# Patient Record
Sex: MALE | Race: WHITE | NOT HISPANIC OR LATINO | Employment: FULL TIME | URBAN - METROPOLITAN AREA
[De-identification: names, ages, dates, MRNs, and addresses within clinical notes are randomized per-mention and may not be internally consistent; named-entity substitution may affect disease eponyms.]

---

## 2017-12-21 ENCOUNTER — OFFICE VISIT (OUTPATIENT)
Dept: MEDICAL GROUP | Facility: PHYSICIAN GROUP | Age: 22
End: 2017-12-21
Payer: COMMERCIAL

## 2017-12-21 ENCOUNTER — HOSPITAL ENCOUNTER (OUTPATIENT)
Facility: MEDICAL CENTER | Age: 22
End: 2017-12-21
Attending: NURSE PRACTITIONER
Payer: COMMERCIAL

## 2017-12-21 VITALS
HEART RATE: 95 BPM | TEMPERATURE: 99.5 F | HEIGHT: 77 IN | WEIGHT: 205.7 LBS | DIASTOLIC BLOOD PRESSURE: 78 MMHG | OXYGEN SATURATION: 96 % | SYSTOLIC BLOOD PRESSURE: 122 MMHG | BODY MASS INDEX: 24.29 KG/M2

## 2017-12-21 DIAGNOSIS — Z11.3 SCREEN FOR SEXUALLY TRANSMITTED DISEASES: ICD-10-CM

## 2017-12-21 DIAGNOSIS — Z76.89 ENCOUNTER TO ESTABLISH CARE: ICD-10-CM

## 2017-12-21 DIAGNOSIS — Z00.00 ENCOUNTER FOR PREVENTIVE HEALTH EXAMINATION: ICD-10-CM

## 2017-12-21 LAB
APPEARANCE UR: CLEAR
BILIRUB UR STRIP-MCNC: NORMAL MG/DL
COLOR UR AUTO: NORMAL
GLUCOSE UR STRIP.AUTO-MCNC: NORMAL MG/DL
KETONES UR STRIP.AUTO-MCNC: NORMAL MG/DL
LEUKOCYTE ESTERASE UR QL STRIP.AUTO: NORMAL
NITRITE UR QL STRIP.AUTO: NORMAL
PH UR STRIP.AUTO: 7 [PH] (ref 5–8)
PROT UR QL STRIP: NORMAL MG/DL
RBC UR QL AUTO: NORMAL
SP GR UR STRIP.AUTO: 1.01
UROBILINOGEN UR STRIP-MCNC: NORMAL MG/DL

## 2017-12-21 PROCEDURE — 87591 N.GONORRHOEAE DNA AMP PROB: CPT

## 2017-12-21 PROCEDURE — 99385 PREV VISIT NEW AGE 18-39: CPT | Performed by: NURSE PRACTITIONER

## 2017-12-21 PROCEDURE — 87491 CHLMYD TRACH DNA AMP PROBE: CPT

## 2017-12-21 PROCEDURE — 81002 URINALYSIS NONAUTO W/O SCOPE: CPT | Performed by: NURSE PRACTITIONER

## 2017-12-21 ASSESSMENT — PATIENT HEALTH QUESTIONNAIRE - PHQ9: CLINICAL INTERPRETATION OF PHQ2 SCORE: 0

## 2017-12-21 NOTE — PROGRESS NOTES
Chief Complaint   Patient presents with   • New Patient     STD check      See Sylvia Osborne is a 22 y.o. male here to establish care and for evaluation and management of the following:  HPI:    Most medical care provided by the team doctor. 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  Recently discovered girlfriend was not monogamous -  Recently tested positive for chlamydia after her other partner disclosed his infection. She informed him yesterday. He was initially using condoms but not recently. Last sexual activity 2.5 weeks ago. Denies any symptoms at this time - no dysuria, burning, pain, rash. Denies other risk factors.       Current medicines (including changes today)  No current outpatient prescriptions on file.     No current facility-administered medications for this visit.      He  has no chronic medical problems in past medical history.  He  has no past surgery.  Social History     Social History   • Marital status: Single     Spouse name: N/A   • Number of children: 0   • Years of education: N/A     Occupational History   •       Social History Main Topics   • Smoking status: Never Smoker   • Smokeless tobacco: Never Used   • Alcohol use Yes      Comment: Occassianally    • Drug use: No   • Sexual activity: Yes     Partners: Female     Other Topics Concern   • Not on file     Social History Narrative   • No narrative on file     History reviewed. No pertinent family history.  Family Status   Relation Status   • Mother Alive   • Father Alive   • Sister Alive       ROS  Constitutional: Negative for fever, chills, and unexplained weight loss.   HENT: Negative for ear pain, nosebleeds, and sore throat.  Eyes: Negative for changes in vision.   Respiratory: Negative for increased cough, sputum production, or wheezing.   Cardiovascular: Negative for chest pain, palpitations.   Gastrointestinal: Negative for constipation, diarrhea.   Genitourinary: Negative for dysuria.  "  Musculoskeletal: Negative for unusual joint pain or myalgias.   Skin: Negative for rash and itching.   Neurological: Negative for dizziness, tremors, focal weakness.   All other systems reviewed and are negative except as in HPI.         Objective:   Blood pressure 122/78, pulse 95, temperature 37.5 °C (99.5 °F), height 1.956 m (6' 5\"), weight 93.3 kg (205 lb 11.2 oz), SpO2 96 %. Body mass index is 24.39 kg/m².  Physical Exam:  Constitutional: Alert, oriented, in no acute distress.  Pleasant and cooperative with the examination.  Psych: Eye contact is good, speech goal directed, affect calm, normal judgement and insight.  Skin: Warm, dry, no rashes in visible areas.  HEENT: PER, conjunctiva and sclera clear.  Nares patent with no significant congestion or drainage.  Normal pinnae Neck: Supple, trachea midline.   Lungs: Normal effort and respirations. Clear to auscultation bilaterally.  CV: Regular rate and rhythm.  Lower extremities: no edema, pulses intact.  Neurological:  Grossly intact.        Assessment and Plan:   The following treatment plan was discussed    1. Encounter for preventive health examination  Well male     2. Screen for sexually transmitted diseases  Differential diagnosis, natural history, treatment options, supportive care, and indications for immediate follow-up discussed at length. Counseled regarding lifestyle modifications. He'll be notified of results and recommended treatment as indicated.  GC/CHLAMYDIA - specimen collected and sent to lab    3. Encounter to establish care      Followup: Return in about 1 year (around 12/21/2018) for preventive care visit.  Sooner if needed or with any new problems.         Take all medications as directed.  Report any side effects of medications.   Report any new symptoms.  Follow up as advised.  Have labs or other studies as ordered done as directed prior to follow up.  Please keep all appointments for any referrals given.    Please note this dictation " was created using voice recognition software. Every reasonable attempt has been made to correct obvious errors, however there may be errors of grammar and possibly content that were not discovered before finalizing the note.

## 2017-12-22 LAB
C TRACH DNA SPEC QL NAA+PROBE: NEGATIVE
N GONORRHOEA DNA SPEC QL NAA+PROBE: NEGATIVE
SPECIMEN SOURCE: NORMAL

## 2019-04-25 ENCOUNTER — HOSPITAL ENCOUNTER (OUTPATIENT)
Facility: MEDICAL CENTER | Age: 24
End: 2019-04-25
Attending: PHYSICIAN ASSISTANT
Payer: COMMERCIAL

## 2019-04-25 ENCOUNTER — OFFICE VISIT (OUTPATIENT)
Dept: URGENT CARE | Facility: PHYSICIAN GROUP | Age: 24
End: 2019-04-25
Payer: COMMERCIAL

## 2019-04-25 VITALS
TEMPERATURE: 99.3 F | HEIGHT: 77 IN | HEART RATE: 71 BPM | OXYGEN SATURATION: 98 % | SYSTOLIC BLOOD PRESSURE: 122 MMHG | WEIGHT: 206 LBS | BODY MASS INDEX: 24.32 KG/M2 | DIASTOLIC BLOOD PRESSURE: 68 MMHG

## 2019-04-25 DIAGNOSIS — R30.0 DYSURIA: ICD-10-CM

## 2019-04-25 PROCEDURE — 87086 URINE CULTURE/COLONY COUNT: CPT

## 2019-04-25 PROCEDURE — 99213 OFFICE O/P EST LOW 20 MIN: CPT | Performed by: PHYSICIAN ASSISTANT

## 2019-04-25 ASSESSMENT — ENCOUNTER SYMPTOMS
ABDOMINAL PAIN: 1
FLANK PAIN: 0

## 2019-04-25 ASSESSMENT — PAIN SCALES - GENERAL: PAINLEVEL: 5=MODERATE PAIN

## 2019-04-25 NOTE — PROGRESS NOTES
"  Subjective:   See Osborne is a 23 y.o. male who presents today with   Chief Complaint   Patient presents with   • Pelvic Pain     Lower abd pressure, dysuria, Was tested to STI and UTI and clear       Pelvic Pain   This is a new problem. The current episode started 1 to 4 weeks ago. The problem occurs constantly. The problem has been unchanged. Associated symptoms include abdominal pain (pressure) and urinary symptoms (dysuria).     Patient states that he did an online STD test which he took and request labs and everything came back negative he also did a urine test for UTI which he states came back negative.  He has been having lower abdominal pressure intermittently and has some irritation with every urination.  He denies any other associated symptoms.  PMH:  has no past medical history on file.  MEDS: No current outpatient prescriptions on file.  ALLERGIES: No Known Allergies  SURGHX: No past surgical history on file.  SOCHX:  reports that he has never smoked. He has never used smokeless tobacco. He reports that he drinks alcohol. He reports that he does not use drugs.  FH: Reviewed with patient, not pertinent to this visit.       Review of Systems   Gastrointestinal: Positive for abdominal pain (pressure).   Genitourinary: Positive for dysuria and pelvic pain. Negative for flank pain, frequency, hematuria and urgency.        Objective:   /68   Pulse 71   Temp 37.4 °C (99.3 °F)   Ht 1.956 m (6' 5\")   Wt 93.4 kg (206 lb)   SpO2 98%   BMI 24.43 kg/m²   Physical Exam   Constitutional: Vital signs are normal. He appears well-developed and well-nourished. No distress.   HENT:   Head: Normocephalic and atraumatic.   Right Ear: Hearing normal.   Left Ear: Hearing normal.   Eyes: Pupils are equal, round, and reactive to light.   Cardiovascular: Normal rate, regular rhythm and normal heart sounds.    Pulmonary/Chest: Effort normal.   Abdominal: There is tenderness in the suprapubic area. "   Genitourinary: Testes normal and penis normal.   Genitourinary Comments: Patient deferred  exam   Musculoskeletal:   Normal movement in all 4 extremities   Neurological: He is alert. Coordination normal.   Skin: Skin is warm and dry.   Psychiatric: He has a normal mood and affect.   Nursing note and vitals reviewed.    UA negative    Assessment/Plan:   Assessment    1. Dysuria  - POCT Urinalysis  - REFERRAL TO UROLOGY  - Urine Culture; Future  Given patient's unimpressive exam and negative urine today patient will follow-up with urology unless symptoms worsen he will be seen sooner.  Differential diagnosis, natural history, supportive care, and indications for immediate follow-up discussed.   Patient given instructions and understanding of medications and treatment.    If not improving in 3-5 days, F/U with PCP or return to UC if symptoms worsen.    Patient agreeable to plan.      John Harp PA-C

## 2019-04-26 DIAGNOSIS — R30.0 DYSURIA: ICD-10-CM

## 2019-04-28 LAB
BACTERIA UR CULT: NORMAL
SIGNIFICANT IND 70042: NORMAL
SITE SITE: NORMAL
SOURCE SOURCE: NORMAL

## 2023-10-26 ENCOUNTER — OFFICE VISIT (OUTPATIENT)
Dept: URGENT CARE | Facility: PHYSICIAN GROUP | Age: 28
End: 2023-10-26
Payer: COMMERCIAL

## 2023-10-26 VITALS
WEIGHT: 216 LBS | SYSTOLIC BLOOD PRESSURE: 122 MMHG | RESPIRATION RATE: 16 BRPM | OXYGEN SATURATION: 99 % | DIASTOLIC BLOOD PRESSURE: 76 MMHG | HEART RATE: 72 BPM | HEIGHT: 77 IN | BODY MASS INDEX: 25.5 KG/M2 | TEMPERATURE: 97.9 F

## 2023-10-26 DIAGNOSIS — J02.9 SORE THROAT: ICD-10-CM

## 2023-10-26 LAB — S PYO DNA SPEC NAA+PROBE: NOT DETECTED

## 2023-10-26 PROCEDURE — 3078F DIAST BP <80 MM HG: CPT | Performed by: FAMILY MEDICINE

## 2023-10-26 PROCEDURE — 99213 OFFICE O/P EST LOW 20 MIN: CPT | Performed by: FAMILY MEDICINE

## 2023-10-26 PROCEDURE — 87651 STREP A DNA AMP PROBE: CPT | Performed by: FAMILY MEDICINE

## 2023-10-26 PROCEDURE — 3074F SYST BP LT 130 MM HG: CPT | Performed by: FAMILY MEDICINE

## 2023-10-26 ASSESSMENT — ENCOUNTER SYMPTOMS
SORE THROAT: 1
CONSTITUTIONAL NEGATIVE: 1
GASTROINTESTINAL NEGATIVE: 1
CARDIOVASCULAR NEGATIVE: 1
EYES NEGATIVE: 1
RESPIRATORY NEGATIVE: 1

## 2023-10-26 ASSESSMENT — FIBROSIS 4 INDEX: FIB4 SCORE: 1.36

## 2023-10-27 NOTE — PROGRESS NOTES
"Subjective:   See Osborne is a 28 y.o. male who presents for Other (Conjestion ) and Pharyngitis      Other  Associated symptoms include congestion and a sore throat.   Pharyngitis   Associated symptoms include congestion.       Review of Systems   Constitutional: Negative.    HENT:  Positive for congestion and sore throat.    Eyes: Negative.    Respiratory: Negative.     Cardiovascular: Negative.    Gastrointestinal: Negative.    Genitourinary: Negative.    Skin: Negative.        Medications, Allergies, and current problem list reviewed today in Epic.     Objective:     /76   Pulse 72   Temp 36.6 °C (97.9 °F) (Temporal)   Resp 16   Ht 1.956 m (6' 5\")   Wt 98 kg (216 lb)   SpO2 99%     Physical Exam  Vitals and nursing note reviewed.   Constitutional:       Appearance: Normal appearance.   HENT:      Head: Normocephalic and atraumatic.      Right Ear: Tympanic membrane normal.      Left Ear: Tympanic membrane normal.      Nose: Congestion present.      Mouth/Throat:      Pharynx: Posterior oropharyngeal erythema present.   Cardiovascular:      Rate and Rhythm: Normal rate and regular rhythm.      Pulses: Normal pulses.      Heart sounds: Normal heart sounds.   Pulmonary:      Effort: Pulmonary effort is normal.      Breath sounds: Normal breath sounds.   Abdominal:      General: Abdomen is flat. Bowel sounds are normal.   Lymphadenopathy:      Cervical: No cervical adenopathy.   Neurological:      Mental Status: He is alert.         Assessment/Plan:     Diagnosis and associated orders:     1. Sore throat  POCT CEPHEID [employer] A STREP - PCR         Comments/MDM:              Differential diagnosis, natural history, supportive care, and indications for immediate follow-up discussed.    Advised the patient to follow-up with the primary care physician for recheck, reevaluation, and consideration of further management.    Please note that this dictation was created using voice recognition software. I " have made a reasonable attempt to correct obvious errors, but I expect that there are errors of grammar and possibly content that I did not discover before finalizing the note.    This note was electronically signed by Cody Mckay M.D.